# Patient Record
Sex: FEMALE | URBAN - METROPOLITAN AREA
[De-identification: names, ages, dates, MRNs, and addresses within clinical notes are randomized per-mention and may not be internally consistent; named-entity substitution may affect disease eponyms.]

---

## 2020-04-22 ENCOUNTER — APPOINTMENT (RX ONLY)
Dept: URBAN - METROPOLITAN AREA CLINIC 47 | Facility: CLINIC | Age: 65
Setting detail: DERMATOLOGY
End: 2020-04-22

## 2020-04-22 DIAGNOSIS — H01.13 ECZEMATOUS DERMATITIS OF EYELID: ICD-10-CM

## 2020-04-22 PROBLEM — H01.139 ECZEMATOUS DERMATITIS OF UNSPECIFIED EYE, UNSPECIFIED EYELID: Status: ACTIVE | Noted: 2020-04-22

## 2020-04-22 PROCEDURE — ? PRESCRIPTION

## 2020-04-22 PROCEDURE — 99202 OFFICE O/P NEW SF 15 MIN: CPT

## 2020-04-22 PROCEDURE — ? PATIENT SPECIFIC COUNSELING

## 2020-04-22 PROCEDURE — ? ADDITIONAL NOTES

## 2020-04-22 PROCEDURE — ? COUNSELING

## 2020-04-22 RX ORDER — TACROLIMUS 1 MG/G
OINTMENT TOPICAL
Qty: 1 | Refills: 1 | Status: ERX | COMMUNITY
Start: 2020-04-22

## 2020-04-22 RX ADMIN — TACROLIMUS: 1 OINTMENT TOPICAL at 00:00

## 2020-04-22 ASSESSMENT — LOCATION SIMPLE DESCRIPTION DERM
LOCATION SIMPLE: RIGHT EYEBROW
LOCATION SIMPLE: LEFT EYEBROW
LOCATION SIMPLE: RIGHT EYEBROW
LOCATION SIMPLE: RIGHT CHEEK
LOCATION SIMPLE: LEFT CHEEK

## 2020-04-22 ASSESSMENT — LOCATION ZONE DERM
LOCATION ZONE: FACE
LOCATION ZONE: FACE

## 2020-04-22 ASSESSMENT — LOCATION DETAILED DESCRIPTION DERM
LOCATION DETAILED: LEFT SUPERIOR MEDIAL MALAR CHEEK
LOCATION DETAILED: RIGHT LATERAL EYEBROW
LOCATION DETAILED: LEFT MEDIAL EYEBROW
LOCATION DETAILED: RIGHT MEDIAL EYEBROW
LOCATION DETAILED: RIGHT SUPERIOR MEDIAL MALAR CHEEK
LOCATION DETAILED: RIGHT CENTRAL EYEBROW
LOCATION DETAILED: RIGHT LATERAL EYEBROW
LOCATION DETAILED: RIGHT SUPERIOR CENTRAL MALAR CHEEK

## 2020-04-22 NOTE — PROCEDURE: PATIENT SPECIFIC COUNSELING
switch to [facility], use gentle moisturizers and cleansers, vaseline, cool cloths.  Stop ACV drink, use baking soda 3 times a week in mouth. Limit acidic foods. switch to [facility], use gentle moisturizers and cleansers, vaseline, cool cloths.  Stop ACV drink, use baking soda 3 times a week in mouth. Limit acidic foods.

## 2020-04-22 NOTE — HPI: RASH (EYELID DERMATITIS)
Is This A New Presentation, Or A Follow-Up?: Eyelid Dermatitis
How Severe Is Your Rash?: moderate
Additional History: Uses sensodyne toothpaste, whitening also strips. TAC not helping